# Patient Record
Sex: MALE
[De-identification: names, ages, dates, MRNs, and addresses within clinical notes are randomized per-mention and may not be internally consistent; named-entity substitution may affect disease eponyms.]

---

## 2017-12-15 PROBLEM — Z00.00 ENCOUNTER FOR PREVENTIVE HEALTH EXAMINATION: Status: ACTIVE | Noted: 2017-12-15

## 2018-01-08 ENCOUNTER — APPOINTMENT (OUTPATIENT)
Dept: CARDIOLOGY | Facility: CLINIC | Age: 38
End: 2018-01-08
Payer: COMMERCIAL

## 2018-01-08 PROCEDURE — 99214 OFFICE O/P EST MOD 30 MIN: CPT

## 2018-01-08 PROCEDURE — 93000 ELECTROCARDIOGRAM COMPLETE: CPT

## 2018-01-25 ENCOUNTER — APPOINTMENT (OUTPATIENT)
Dept: CARDIOLOGY | Facility: CLINIC | Age: 38
End: 2018-01-25

## 2018-02-15 ENCOUNTER — APPOINTMENT (OUTPATIENT)
Dept: CARDIOLOGY | Facility: CLINIC | Age: 38
End: 2018-02-15

## 2018-12-27 ENCOUNTER — RECORD ABSTRACTING (OUTPATIENT)
Age: 38
End: 2018-12-27

## 2018-12-27 DIAGNOSIS — S83.209A UNSPECIFIED TEAR OF UNSPECIFIED MENISCUS, CURRENT INJURY, UNSPECIFIED KNEE, INITIAL ENCOUNTER: ICD-10-CM

## 2018-12-27 DIAGNOSIS — Z82.49 FAMILY HISTORY OF ISCHEMIC HEART DISEASE AND OTHER DISEASES OF THE CIRCULATORY SYSTEM: ICD-10-CM

## 2018-12-27 DIAGNOSIS — M19.90 UNSPECIFIED OSTEOARTHRITIS, UNSPECIFIED SITE: ICD-10-CM

## 2018-12-27 DIAGNOSIS — Z78.9 OTHER SPECIFIED HEALTH STATUS: ICD-10-CM

## 2018-12-27 DIAGNOSIS — Z87.448 PERSONAL HISTORY OF OTHER DISEASES OF URINARY SYSTEM: ICD-10-CM

## 2018-12-31 ENCOUNTER — APPOINTMENT (OUTPATIENT)
Dept: CARDIOLOGY | Facility: CLINIC | Age: 38
End: 2018-12-31
Payer: COMMERCIAL

## 2018-12-31 VITALS
HEIGHT: 71 IN | DIASTOLIC BLOOD PRESSURE: 98 MMHG | HEART RATE: 70 BPM | OXYGEN SATURATION: 97 % | BODY MASS INDEX: 42.56 KG/M2 | RESPIRATION RATE: 14 BRPM | WEIGHT: 304 LBS | SYSTOLIC BLOOD PRESSURE: 163 MMHG

## 2018-12-31 VITALS — SYSTOLIC BLOOD PRESSURE: 150 MMHG | DIASTOLIC BLOOD PRESSURE: 90 MMHG

## 2018-12-31 PROCEDURE — 99214 OFFICE O/P EST MOD 30 MIN: CPT

## 2018-12-31 PROCEDURE — 93000 ELECTROCARDIOGRAM COMPLETE: CPT

## 2018-12-31 NOTE — HISTORY OF PRESENT ILLNESS
[FreeTextEntry1] : Patient is a 39yo M with obesity, HTN, OA here for cardiac follow up. Patient has been doing well without any chest pain or shortness of breath. Injured knee at work a few months back. HAs been getting PT, only mild improvement.  Patient denies PND/orthopnea/edema/palpitations/syncope/claudication CAnt climb stairs due to knee pain. \par \par ROS: GI and  negative

## 2018-12-31 NOTE — PHYSICAL EXAM
[General Appearance - Well Developed] : well developed [General Appearance - Well Nourished] : well nourished [Normal Conjunctiva] : the conjunctiva exhibited no abnormalities [Normal Oropharynx] : normal oropharynx [Normal Jugular Venous V Waves Present] : normal jugular venous V waves present [] : no respiratory distress [Respiration, Rhythm And Depth] : normal respiratory rhythm and effort [Auscultation Breath Sounds / Voice Sounds] : lungs were clear to auscultation bilaterally [Heart Rate And Rhythm] : heart rate and rhythm were normal [Heart Sounds] : normal S1 and S2 [Murmurs] : no murmurs present [Bowel Sounds] : normal bowel sounds [Abdomen Soft] : soft [Abdomen Tenderness] : non-tender [Abnormal Walk] : normal gait [Nail Clubbing] : no clubbing of the fingernails [Cyanosis, Localized] : no localized cyanosis [Skin Color & Pigmentation] : normal skin color and pigmentation [Skin Turgor] : normal skin turgor [Oriented To Time, Place, And Person] : oriented to person, place, and time [Affect] : the affect was normal [FreeTextEntry1] : no edema

## 2018-12-31 NOTE — DISCUSSION/SUMMARY
[FreeTextEntry1] : Patient is a 39yo M with obesity, HTN, OA here for cardiac follow up. Patient has been doing well without any chest pain or shortness of breath. HOwever BP suboptimally controlled and ECG with signs of LVH. Needs YANNICK treated as well, advised follow up with LI lung. \par \par 1. Increase amlodipine to 10mg daily, consider adding HCTZ or ARB if still suboptimally controlled\par 2. LI lung evaluation for YANNICK, needs CPAP\par 3. Recommend aggressive diet and lifestyle modifications \par 4. Increase physical activity as tolerated \par 5. Follow up 6 weeks

## 2018-12-31 NOTE — ASSESSMENT
[FreeTextEntry1] : ECG: SR, anterolateral and septal TWI\par  TG 70 HDL 40  (3/2018)\par \par EST 2016:\par 1. Negative exercise stress test for ischemia.\par 2. The patient developed no dysrhythmias during exercise.\par 3. The blood pressure response was hypertensive baseline with normal response to exercise.\par 4. The patient developed knee pain during the stress exam. The symptoms resolved with rest.\par 5. The test was terminated due to knee pain.\par 6. The patient's functional capacity was below average.\par 7. The Duke Treadmill Score was 7, consistent with low risk.\par 8. Recommend clinical correlation with the above findings.\par \par ECHO 2016:\par 1. Left ventricular ejection fraction, by visual estimation, is 60 to 65%.\par 2. Normal global left ventricular systolic function.\par 3. Impaired relaxation pattern of LV diastolic filling.\par 4. Normal left ventricular internal cavity size.\par 5. Mild concentric left ventricular hypertrophy.\par 6. Normal right ventricular size and systolic function.\par 7. Borderline dilated left atrium.\par 8. The right atrium is normal in size and structure.\par 9. Trace mitral valve regurgitation.\par 10. Normal aortic valve, without any evidence of aortic stenosis or insufficiency.\par 11. Trace pulmonic valve regurgitation.\par 12. There is borderline dilatation of the aortic root.\par 13. There is no evidence of pericardial effusion.\par 14. In comparison to prior studies there is no significant interval change.\par 15. Recommend clinical correlation with the above findings.

## 2019-02-05 ENCOUNTER — APPOINTMENT (OUTPATIENT)
Dept: CARDIOLOGY | Facility: CLINIC | Age: 39
End: 2019-02-05

## 2019-02-13 ENCOUNTER — APPOINTMENT (OUTPATIENT)
Dept: CARDIOLOGY | Facility: CLINIC | Age: 39
End: 2019-02-13
Payer: COMMERCIAL

## 2019-02-13 PROCEDURE — 93306 TTE W/DOPPLER COMPLETE: CPT

## 2019-02-14 ENCOUNTER — APPOINTMENT (OUTPATIENT)
Dept: CARDIOLOGY | Facility: CLINIC | Age: 39
End: 2019-02-14
Payer: COMMERCIAL

## 2019-02-14 VITALS
BODY MASS INDEX: 41.58 KG/M2 | DIASTOLIC BLOOD PRESSURE: 89 MMHG | HEART RATE: 61 BPM | SYSTOLIC BLOOD PRESSURE: 144 MMHG | OXYGEN SATURATION: 97 % | WEIGHT: 297 LBS | RESPIRATION RATE: 14 BRPM | HEIGHT: 71 IN

## 2019-02-14 PROCEDURE — 99213 OFFICE O/P EST LOW 20 MIN: CPT

## 2019-02-14 NOTE — HISTORY OF PRESENT ILLNESS
[FreeTextEntry1] : Patient is a 39yo M with obesity, HTN, OA here for cardiac follow up. Patient has been doing well without any chest pain or shortness of breath. Injured knee at work a few months back.  Patient denies PND/orthopnea/edema/palpitations/syncope/claudication CAnt climb stairs due to knee pain and still waiting further plan. Out on workers compensation. Tolerating higher dose amlodipine well. \par \par ROS: GI and  negative

## 2019-02-14 NOTE — ASSESSMENT
[FreeTextEntry1] : \par  TG 70 HDL 40  (3/2018)\par \par ECHO 2/2019:\par 1. Mild concentric left ventricular hypertrophy.\par 2. Normal global left ventricular systolic function.\par 3. Left ventricular ejection fraction, by visual estimation, is 55 to 60%.\par 4. Impaired relaxation pattern of LV diastolic filling.\par 5. Normal right ventricular size and systolic function.\par 6. Mildly dilated left atrium.\par 7. Moderately dilated right atrium.\par 8. Normal aortic valve, without any evidence of aortic stenosis or insufficiency.\par 9. Mild thickening of the anterior and posterior mitral valve leaflets.\par 10. Trace mitral valve regurgitation.\par 11. Mildly elevated pulmonary artery systolic pressure.\par 12. Mild tricuspid regurgitation.\par 13. Interatrial and interventricular septa appear intact.\par 14. Recommend clinical correlation with the above findings.\par \par EST 2016:\par 1. Negative exercise stress test for ischemia.\par 2. The patient developed no dysrhythmias during exercise.\par 3. The blood pressure response was hypertensive baseline with normal response to exercise.\par 4. The patient developed knee pain during the stress exam. The symptoms resolved with rest.\par 5. The test was terminated due to knee pain.\par 6. The patient's functional capacity was below average.\par 7. The Duke Treadmill Score was 7, consistent with low risk.\par 8. Recommend clinical correlation with the above findings.\par \par ECHO 2016:\par 1. Left ventricular ejection fraction, by visual estimation, is 60 to 65%.\par 2. Normal global left ventricular systolic function.\par 3. Impaired relaxation pattern of LV diastolic filling.\par 4. Normal left ventricular internal cavity size.\par 5. Mild concentric left ventricular hypertrophy.\par 6. Normal right ventricular size and systolic function.\par 7. Borderline dilated left atrium.\par 8. The right atrium is normal in size and structure.\par 9. Trace mitral valve regurgitation.\par 10. Normal aortic valve, without any evidence of aortic stenosis or insufficiency.\par 11. Trace pulmonic valve regurgitation.\par 12. There is borderline dilatation of the aortic root.\par 13. There is no evidence of pericardial effusion.\par 14. In comparison to prior studies there is no significant interval change.\par 15. Recommend clinical correlation with the above findings.

## 2019-02-14 NOTE — DISCUSSION/SUMMARY
[FreeTextEntry1] : Patient is a 37yo M with obesity, HTN, OA here for cardiac follow up. Patient has been doing well without any chest pain or shortness of breath. Echo with signs of hypertensive heart disease (LVH, borderline increased PAP, LAE). BP better, patient motivated to lose weight and exercise more. Will try this prior to starting additional medication. \par \par 1. Continue higher dose amlodipine\par 2. LI lung evaluation for YANNICK, needs CPAP\par 3. Recommend aggressive diet and lifestyle modifications \par 4. Increase physical activity as tolerated \par 5. Re-evaluate BP in 3 months, if remains elevated will add medication (HCTZ vs ARB)\par 6. Counselled on diet and weight loss\par 7. Follow up 3 months

## 2019-04-29 ENCOUNTER — RX RENEWAL (OUTPATIENT)
Age: 39
End: 2019-04-29

## 2019-07-10 ENCOUNTER — APPOINTMENT (OUTPATIENT)
Dept: CARDIOLOGY | Facility: CLINIC | Age: 39
End: 2019-07-10
Payer: COMMERCIAL

## 2019-07-10 ENCOUNTER — NON-APPOINTMENT (OUTPATIENT)
Age: 39
End: 2019-07-10

## 2019-07-10 VITALS
RESPIRATION RATE: 16 BRPM | WEIGHT: 305 LBS | HEART RATE: 59 BPM | BODY MASS INDEX: 42.7 KG/M2 | SYSTOLIC BLOOD PRESSURE: 137 MMHG | DIASTOLIC BLOOD PRESSURE: 85 MMHG | HEIGHT: 71 IN

## 2019-07-10 PROCEDURE — 93000 ELECTROCARDIOGRAM COMPLETE: CPT

## 2019-07-10 PROCEDURE — 99214 OFFICE O/P EST MOD 30 MIN: CPT

## 2019-07-10 NOTE — HISTORY OF PRESENT ILLNESS
[FreeTextEntry1] : Patient is a 39yo M with obesity, HTN, OA here for cardiac follow up. Patient has been doing well without any chest pain or shortness of breath. Injured knee at work a several months back.  Patient denies PND/orthopnea/edema/palpitations/syncope/claudication CAnt climb stairs due to knee pain and still waiting further plan. Remains out on workers compensation. \par \par ROS: GI and  negative

## 2019-07-10 NOTE — ASSESSMENT
[FreeTextEntry1] : ECG: SB, NSST \par \par  TG 70 HDL 40  (3/2018)\par \par ECHO 2/2019:\par 1. Mild concentric left ventricular hypertrophy.\par 2. Normal global left ventricular systolic function.\par 3. Left ventricular ejection fraction, by visual estimation, is 55 to 60%.\par 4. Impaired relaxation pattern of LV diastolic filling.\par 5. Normal right ventricular size and systolic function.\par 6. Mildly dilated left atrium.\par 7. Moderately dilated right atrium.\par 8. Normal aortic valve, without any evidence of aortic stenosis or insufficiency.\par 9. Mild thickening of the anterior and posterior mitral valve leaflets.\par 10. Trace mitral valve regurgitation.\par 11. Mildly elevated pulmonary artery systolic pressure.\par 12. Mild tricuspid regurgitation.\par 13. Interatrial and interventricular septa appear intact.\par 14. Recommend clinical correlation with the above findings.\par \par EST 2016:\par 1. Negative exercise stress test for ischemia.\par 2. The patient developed no dysrhythmias during exercise.\par 3. The blood pressure response was hypertensive baseline with normal response to exercise.\par 4. The patient developed knee pain during the stress exam. The symptoms resolved with rest.\par 5. The test was terminated due to knee pain.\par 6. The patient's functional capacity was below average.\par 7. The Duke Treadmill Score was 7, consistent with low risk.\par 8. Recommend clinical correlation with the above findings.\par \par ECHO 2016:\par 1. Left ventricular ejection fraction, by visual estimation, is 60 to 65%.\par 2. Normal global left ventricular systolic function.\par 3. Impaired relaxation pattern of LV diastolic filling.\par 4. Normal left ventricular internal cavity size.\par 5. Mild concentric left ventricular hypertrophy.\par 6. Normal right ventricular size and systolic function.\par 7. Borderline dilated left atrium.\par 8. The right atrium is normal in size and structure.\par 9. Trace mitral valve regurgitation.\par 10. Normal aortic valve, without any evidence of aortic stenosis or insufficiency.\par 11. Trace pulmonic valve regurgitation.\par 12. There is borderline dilatation of the aortic root.\par 13. There is no evidence of pericardial effusion.\par 14. In comparison to prior studies there is no significant interval change.\par 15. Recommend clinical correlation with the above findings.

## 2019-07-10 NOTE — PHYSICAL EXAM
[General Appearance - Well Developed] : well developed [General Appearance - Well Nourished] : well nourished [Normal Conjunctiva] : the conjunctiva exhibited no abnormalities [Normal Oropharynx] : normal oropharynx [] : no respiratory distress [Normal Jugular Venous V Waves Present] : normal jugular venous V waves present [Respiration, Rhythm And Depth] : normal respiratory rhythm and effort [Auscultation Breath Sounds / Voice Sounds] : lungs were clear to auscultation bilaterally [Heart Sounds] : normal S1 and S2 [Heart Rate And Rhythm] : heart rate and rhythm were normal [Murmurs] : no murmurs present [Bowel Sounds] : normal bowel sounds [Abdomen Tenderness] : non-tender [Abdomen Soft] : soft [Abnormal Walk] : normal gait [Nail Clubbing] : no clubbing of the fingernails [Cyanosis, Localized] : no localized cyanosis [Skin Color & Pigmentation] : normal skin color and pigmentation [Oriented To Time, Place, And Person] : oriented to person, place, and time [Skin Turgor] : normal skin turgor [Affect] : the affect was normal [FreeTextEntry1] : no edema

## 2019-12-19 ENCOUNTER — NON-APPOINTMENT (OUTPATIENT)
Age: 39
End: 2019-12-19

## 2019-12-19 ENCOUNTER — APPOINTMENT (OUTPATIENT)
Dept: CARDIOLOGY | Facility: CLINIC | Age: 39
End: 2019-12-19
Payer: COMMERCIAL

## 2019-12-19 VITALS
BODY MASS INDEX: 41.86 KG/M2 | HEIGHT: 71 IN | RESPIRATION RATE: 16 BRPM | WEIGHT: 299 LBS | OXYGEN SATURATION: 97 % | DIASTOLIC BLOOD PRESSURE: 110 MMHG | SYSTOLIC BLOOD PRESSURE: 159 MMHG | HEART RATE: 60 BPM

## 2019-12-19 PROCEDURE — 99214 OFFICE O/P EST MOD 30 MIN: CPT

## 2019-12-19 PROCEDURE — 93000 ELECTROCARDIOGRAM COMPLETE: CPT

## 2019-12-19 NOTE — ASSESSMENT
[FreeTextEntry1] : ECG: SB, NSST \par \par  TG 70 HDL 40  (3/2018)\par \par ECHO 2/2019:\par 1. Mild concentric left ventricular hypertrophy.\par 2. Normal global left ventricular systolic function.\par 3. Left ventricular ejection fraction, by visual estimation, is 55 to 60%.\par 4. Impaired relaxation pattern of LV diastolic filling.\par 5. Normal right ventricular size and systolic function.\par 6. Mildly dilated left atrium.\par 7. Moderately dilated right atrium.\par 8. Normal aortic valve, without any evidence of aortic stenosis or insufficiency.\par 9. Mild thickening of the anterior and posterior mitral valve leaflets.\par 10. Trace mitral valve regurgitation.\par 11. Mildly elevated pulmonary artery systolic pressure.\par 12. Mild tricuspid regurgitation.\par 13. Interatrial and interventricular septa appear intact.\par 14. Recommend clinical correlation with the above findings.\par \par EST 2016:\par 1. Negative exercise stress test for ischemia.\par 2. The patient developed no dysrhythmias during exercise.\par 3. The blood pressure response was hypertensive baseline with normal response to exercise.\par 4. The patient developed knee pain during the stress exam. The symptoms resolved with rest.\par 5. The test was terminated due to knee pain.\par 6. The patient's functional capacity was below average.\par 7. The Duke Treadmill Score was 7, consistent with low risk.\par \par

## 2019-12-19 NOTE — HISTORY OF PRESENT ILLNESS
[FreeTextEntry1] : Patient is a 40yo M with obesity, HTN, OA here for cardiac follow up. Patient has been doing well without any chest pain or shortness of breath. Injured knee at work a several months back and had been out on workers compensation which he still is. Patient denies PND/orthopnea/edema/palpitations/syncope/claudication. HCTZ added at last visit for HTN. However not taking BP meds now. Was having side effects including sexual side effects. NOt feeling well on meds as well, felt "drugged". \par \par ROS: GI and  negative

## 2019-12-19 NOTE — PHYSICAL EXAM
[General Appearance - Well Developed] : well developed [General Appearance - Well Nourished] : well nourished [Normal Conjunctiva] : the conjunctiva exhibited no abnormalities [Normal Oropharynx] : normal oropharynx [] : no respiratory distress [Normal Jugular Venous V Waves Present] : normal jugular venous V waves present [Auscultation Breath Sounds / Voice Sounds] : lungs were clear to auscultation bilaterally [Respiration, Rhythm And Depth] : normal respiratory rhythm and effort [Heart Rate And Rhythm] : heart rate and rhythm were normal [Heart Sounds] : normal S1 and S2 [Murmurs] : no murmurs present [Bowel Sounds] : normal bowel sounds [Abdomen Soft] : soft [Abdomen Tenderness] : non-tender [Nail Clubbing] : no clubbing of the fingernails [Abnormal Walk] : normal gait [Cyanosis, Localized] : no localized cyanosis [Skin Color & Pigmentation] : normal skin color and pigmentation [Skin Turgor] : normal skin turgor [Oriented To Time, Place, And Person] : oriented to person, place, and time [Affect] : the affect was normal [FreeTextEntry1] : no edema

## 2020-03-16 ENCOUNTER — APPOINTMENT (OUTPATIENT)
Dept: CARDIOLOGY | Facility: CLINIC | Age: 40
End: 2020-03-16

## 2020-03-24 ENCOUNTER — APPOINTMENT (OUTPATIENT)
Dept: CARDIOLOGY | Facility: CLINIC | Age: 40
End: 2020-03-24

## 2020-04-24 ENCOUNTER — APPOINTMENT (OUTPATIENT)
Dept: CARDIOLOGY | Facility: CLINIC | Age: 40
End: 2020-04-24
Payer: COMMERCIAL

## 2020-04-24 PROCEDURE — 99214 OFFICE O/P EST MOD 30 MIN: CPT | Mod: 95

## 2020-04-24 NOTE — HISTORY OF PRESENT ILLNESS
[Other Location: e.g. Home (Enter Location, City,State)___] : at [unfilled] [Home] : at home, [unfilled] , at the time of the visit. [Patient] : the patient [Self] : self [FreeTextEntry2] : Adam Khan [FreeTextEntry1] : Patient is a 38yo M with obesity, HTN, OA here for cardiac telehealth follow up. Patient has been doing well without any chest pain or shortness of breath. Injured knee at work a several months back and had been out on workers compensation which he still is. Started on Valsartan at last visit but stopped due to concern about side effects. Patient denies PND/orthopnea/edema/palpitations/syncope/claudication. HAs been monitoring BP at home. TOday 140/96, has been 150s/90s as well. HAd one reading 118/75. Has been walking and yoga as a family. Taking ASA and vitamins, eldeberry. Cutting back portions, has lost some weight prior to pandemic but now he is unsure. Wife had a viral illness recently but completely recovered now. Was having myalgias, cough. Wore mask and quarantined in house until resolved. No one else in house ill since then. She has been asx for a few weeks now. \par \par ROS: GI and  negative

## 2020-04-24 NOTE — DISCUSSION/SUMMARY
[FreeTextEntry1] : Patient is a 38yo M with obesity, HTN, OA here for cardiac telehealth follow up. Patient has been doing well without any chest pain or shortness of breath. Echo with signs of hypertensive heart disease (LVH, borderline increased PAP, LAE) in 2019. Needs continued BP control and aggressive diet/lifestyle modifications. BP seems to improve and correlate with weight loss then up with gain. HAs not tolerated multiple meds. Will hold off on restarting medication and continue diet/lifestyle changes first. May need to reinstitute in future and patient understands despite reluctance. Wife likely false negative COVID but has fully recovered. PAtient ? had mild case (brief mild sore throat) but not symptoms to suggest it right not. Continue social distancing and wearing mask/face covering when leaves house. \par \par 1. No meds at this time\par 2. Pulm follow up for YANNICK \par 3. Recommend aggressive diet and lifestyle modifications , counselled on weight loss\par 4. Increase physical activity as tolerated , limited by knee pain\par 5. Follow up 3-4 months, echo at that time\par \par  A total of 25 minutes was spent face to face with patient \par \par

## 2020-04-24 NOTE — ASSESSMENT
[FreeTextEntry1] : \par \par  TG 70 HDL 40  (3/2018)\par \par ECHO 2/2019:\par 1. Mild concentric left ventricular hypertrophy.\par 2. Normal global left ventricular systolic function.\par 3. Left ventricular ejection fraction, by visual estimation, is 55 to 60%.\par 4. Impaired relaxation pattern of LV diastolic filling.\par 5. Normal right ventricular size and systolic function.\par 6. Mildly dilated left atrium.\par 7. Moderately dilated right atrium.\par 8. Normal aortic valve, without any evidence of aortic stenosis or insufficiency.\par 9. Mild thickening of the anterior and posterior mitral valve leaflets.\par 10. Trace mitral valve regurgitation.\par 11. Mildly elevated pulmonary artery systolic pressure.\par 12. Mild tricuspid regurgitation.\par 13. Interatrial and interventricular septa appear intact.\par 14. Recommend clinical correlation with the above findings.\par \par EST 2016:\par 1. Negative exercise stress test for ischemia.\par 2. The patient developed no dysrhythmias during exercise.\par 3. The blood pressure response was hypertensive baseline with normal response to exercise.\par 4. The patient developed knee pain during the stress exam. The symptoms resolved with rest.\par 5. The test was terminated due to knee pain.\par 6. The patient's functional capacity was below average.\par 7. The Duke Treadmill Score was 7, consistent with low risk.\par \par

## 2020-07-15 ENCOUNTER — APPOINTMENT (OUTPATIENT)
Dept: CARDIOLOGY | Facility: CLINIC | Age: 40
End: 2020-07-15
Payer: COMMERCIAL

## 2020-07-15 PROCEDURE — 93306 TTE W/DOPPLER COMPLETE: CPT

## 2020-07-23 ENCOUNTER — APPOINTMENT (OUTPATIENT)
Dept: CARDIOLOGY | Facility: CLINIC | Age: 40
End: 2020-07-23
Payer: COMMERCIAL

## 2020-07-23 PROCEDURE — 99213 OFFICE O/P EST LOW 20 MIN: CPT | Mod: 95

## 2020-07-23 RX ORDER — VALSARTAN 80 MG/1
80 TABLET, COATED ORAL DAILY
Qty: 30 | Refills: 5 | Status: DISCONTINUED | COMMUNITY
Start: 2019-12-19 | End: 2020-07-23

## 2020-07-23 RX ORDER — HYDROCHLOROTHIAZIDE 12.5 MG/1
12.5 TABLET ORAL
Qty: 30 | Refills: 5 | Status: DISCONTINUED | COMMUNITY
Start: 2019-07-10 | End: 2020-07-23

## 2020-07-23 RX ORDER — AMLODIPINE BESYLATE 10 MG/1
10 TABLET ORAL DAILY
Qty: 90 | Refills: 0 | Status: DISCONTINUED | COMMUNITY
Start: 2019-04-29 | End: 2020-07-23

## 2020-07-23 NOTE — PHYSICAL EXAM
[General Appearance - Well Nourished] : well nourished [General Appearance - Well Developed] : well developed [Oriented To Time, Place, And Person] : oriented to person, place, and time

## 2020-07-23 NOTE — ASSESSMENT
[FreeTextEntry1] : \par \par  TG 70 HDL 40  (3/2018)\par \par ECHO 7/2020:\par 1. Mild LVE, EF 55-60%\par 2. Grade I diastolic dysfunction\par 3. Mild LAE,  borderline CAMMIE\par 4. Trivial MR\par 5. LV wall thickness,  PA pressures and RA size have all decreased compared to prior\par \par ECHO 2/2019:\par 1. Mild concentric left ventricular hypertrophy.\par 2. Normal global left ventricular systolic function.\par 3. Left ventricular ejection fraction, by visual estimation, is 55 to 60%.\par 4. Impaired relaxation pattern of LV diastolic filling.\par 5. Normal right ventricular size and systolic function.\par 6. Mildly dilated left atrium.\par 7. Moderately dilated right atrium.\par 8. Normal aortic valve, without any evidence of aortic stenosis or insufficiency.\par 9. Mild thickening of the anterior and posterior mitral valve leaflets.\par 10. Trace mitral valve regurgitation.\par 11. Mildly elevated pulmonary artery systolic pressure.\par 12. Mild tricuspid regurgitation.\par 13. Interatrial and interventricular septa appear intact.\par 14. Recommend clinical correlation with the above findings.\par \par EST 2016:\par 1. Negative exercise stress test for ischemia.\par 2. The patient developed no dysrhythmias during exercise.\par 3. The blood pressure response was hypertensive baseline with normal response to exercise.\par 4. The patient developed knee pain during the stress exam. The symptoms resolved with rest.\par 5. The test was terminated due to knee pain.\par 6. The patient's functional capacity was below average.\par 7. The Duke Treadmill Score was 7, consistent with low risk.\par \par

## 2020-07-23 NOTE — DISCUSSION/SUMMARY
[FreeTextEntry1] : Patient is a 38yo M with obesity, HTN, OA here for cardiac telehealth follow up. Patient has been doing well without any chest pain or shortness of breath. Echo with signs of hypertensive heart disease (LVH, borderline increased PAP, LAE) in 2019 but improved on recent echo this month. WEight down, BP better and will continue with diet/weight loss exercise before putting back on meds. HAs lost over 30 pounds. \par \par 1. Recommend aggressive diet and lifestyle modifications \par 2. Recommend 30 minutes moderate intensity aerobic activity 5 days per week \par 3. Hold off on meds for now\par 4. Follow up 3 months\par \par

## 2020-10-20 ENCOUNTER — APPOINTMENT (OUTPATIENT)
Dept: CARDIOLOGY | Facility: CLINIC | Age: 40
End: 2020-10-20
Payer: COMMERCIAL

## 2020-10-20 ENCOUNTER — NON-APPOINTMENT (OUTPATIENT)
Age: 40
End: 2020-10-20

## 2020-10-20 VITALS
SYSTOLIC BLOOD PRESSURE: 168 MMHG | RESPIRATION RATE: 16 BRPM | TEMPERATURE: 97.3 F | BODY MASS INDEX: 36.4 KG/M2 | WEIGHT: 260 LBS | OXYGEN SATURATION: 100 % | HEIGHT: 71 IN | DIASTOLIC BLOOD PRESSURE: 106 MMHG | HEART RATE: 60 BPM

## 2020-10-20 VITALS — DIASTOLIC BLOOD PRESSURE: 90 MMHG | SYSTOLIC BLOOD PRESSURE: 142 MMHG

## 2020-10-20 PROCEDURE — 99213 OFFICE O/P EST LOW 20 MIN: CPT

## 2020-10-20 PROCEDURE — 93000 ELECTROCARDIOGRAM COMPLETE: CPT

## 2020-10-20 RX ORDER — ASPIRIN 81 MG
81 TABLET, DELAYED RELEASE (ENTERIC COATED) ORAL DAILY
Refills: 3 | Status: DISCONTINUED | COMMUNITY
End: 2020-10-20

## 2020-10-20 NOTE — PHYSICAL EXAM
[General Appearance - Well Developed] : well developed [General Appearance - Well Nourished] : well nourished [Oriented To Time, Place, And Person] : oriented to person, place, and time [Normal Conjunctiva] : the conjunctiva exhibited no abnormalities [Normal Oropharynx] : normal oropharynx [Normal Jugular Venous V Waves Present] : normal jugular venous V waves present [Respiration, Rhythm And Depth] : normal respiratory rhythm and effort [Auscultation Breath Sounds / Voice Sounds] : lungs were clear to auscultation bilaterally [Heart Rate And Rhythm] : heart rate and rhythm were normal [Heart Sounds] : normal S1 and S2 [Edema] : no peripheral edema present [Murmurs] : no murmurs present [Abdomen Soft] : soft [Abdomen Tenderness] : non-tender [Nail Clubbing] : no clubbing of the fingernails [Abnormal Walk] : normal gait [Skin Color & Pigmentation] : normal skin color and pigmentation [Cyanosis, Localized] : no localized cyanosis [Skin Turgor] : normal skin turgor

## 2020-10-20 NOTE — DISCUSSION/SUMMARY
[FreeTextEntry1] : Patient is a 41yo M with obesity, HTN, OA here for cardiac follow up.  Patient has been doing well without any chest pain or shortness of breath. Echo with signs of hypertensive heart disease (LVH, borderline increased PAP, LAE) in 2019 but improved on recent echo this last summer.  WEight down, BP better and will continue with diet/weight loss exercise before putting back on meds. HAs lost significant weight. BP at home seems mostly well controlled. Continue with weight loss. \par \par 1. Recommend aggressive diet and lifestyle modifications \par 2. Recommend 30 minutes moderate intensity aerobic activity 5 days per week \par 3. Hold off on meds again  for now\par 4. Follow up 6 months\par \par

## 2020-10-20 NOTE — HISTORY OF PRESENT ILLNESS
[FreeTextEntry1] : Patient is a 41yo M with obesity, HTN, OA here for cardiac telehealth follow up. Patient has been doing well without any chest pain or shortness of breath. Injured knee at work a several months back and had been out on workers compensation which remains. Patient denies PND/orthopnea/edema/palpitations/syncope/claudication. Continuing to lose weight, intermittent fasting. Eating out less and cooking at home. Walking more as well, a bit less past couple weeks. BP at home running 124/84 yesterday, 143/81 last week and then 127/79 in evening.  \par \par ROS: GI and  negative

## 2020-10-20 NOTE — ASSESSMENT
[FreeTextEntry1] : SR, early repolarization \par \par  TG 70 HDL 40  (3/2018)\par \par ECHO 7/2020:\par 1. Mild LVE, EF 55-60%\par 2. Grade I diastolic dysfunction\par 3. Mild LAE,  borderline CAMMIE\par 4. Trivial MR\par 5. LV wall thickness,  PA pressures and RA size have all decreased compared to prior\par \par ECHO 2/2019:\par 1. Mild concentric left ventricular hypertrophy.\par 2. Normal global left ventricular systolic function.\par 3. Left ventricular ejection fraction, by visual estimation, is 55 to 60%.\par 4. Impaired relaxation pattern of LV diastolic filling.\par 5. Normal right ventricular size and systolic function.\par 6. Mildly dilated left atrium.\par 7. Moderately dilated right atrium.\par 8. Normal aortic valve, without any evidence of aortic stenosis or insufficiency.\par 9. Mild thickening of the anterior and posterior mitral valve leaflets.\par 10. Trace mitral valve regurgitation.\par 11. Mildly elevated pulmonary artery systolic pressure.\par 12. Mild tricuspid regurgitation.\par 13. Interatrial and interventricular septa appear intact.\par 14. Recommend clinical correlation with the above findings.\par \par EST 2016:\par 1. Negative exercise stress test for ischemia.\par 2. The patient developed no dysrhythmias during exercise.\par 3. The blood pressure response was hypertensive baseline with normal response to exercise.\par 4. The patient developed knee pain during the stress exam. The symptoms resolved with rest.\par 5. The test was terminated due to knee pain.\par 6. The patient's functional capacity was below average.\par 7. The Duke Treadmill Score was 7, consistent with low risk.\par \par

## 2020-12-15 ENCOUNTER — NON-APPOINTMENT (OUTPATIENT)
Age: 40
End: 2020-12-15

## 2020-12-28 DIAGNOSIS — I10 ESSENTIAL (PRIMARY) HYPERTENSION: ICD-10-CM

## 2021-04-06 ENCOUNTER — NON-APPOINTMENT (OUTPATIENT)
Age: 41
End: 2021-04-06

## 2021-04-06 ENCOUNTER — APPOINTMENT (OUTPATIENT)
Dept: CARDIOLOGY | Facility: CLINIC | Age: 41
End: 2021-04-06
Payer: COMMERCIAL

## 2021-04-06 VITALS
DIASTOLIC BLOOD PRESSURE: 89 MMHG | OXYGEN SATURATION: 98 % | WEIGHT: 277 LBS | HEART RATE: 64 BPM | BODY MASS INDEX: 38.78 KG/M2 | HEIGHT: 71 IN | TEMPERATURE: 98.3 F | SYSTOLIC BLOOD PRESSURE: 137 MMHG | RESPIRATION RATE: 16 BRPM

## 2021-04-06 PROCEDURE — 93000 ELECTROCARDIOGRAM COMPLETE: CPT

## 2021-04-06 PROCEDURE — 99072 ADDL SUPL MATRL&STAF TM PHE: CPT

## 2021-04-06 PROCEDURE — 99214 OFFICE O/P EST MOD 30 MIN: CPT

## 2021-04-06 RX ORDER — ASPIRIN ENTERIC COATED TABLETS 81 MG 81 MG/1
81 TABLET, DELAYED RELEASE ORAL
Refills: 0 | Status: DISCONTINUED | COMMUNITY
End: 2021-04-06

## 2021-04-06 NOTE — PHYSICAL EXAM
[General Appearance - Well Developed] : well developed [General Appearance - Well Nourished] : well nourished [Normal Conjunctiva] : the conjunctiva exhibited no abnormalities [Normal Oropharynx] : normal oropharynx [Normal Jugular Venous V Waves Present] : normal jugular venous V waves present [Respiration, Rhythm And Depth] : normal respiratory rhythm and effort [Auscultation Breath Sounds / Voice Sounds] : lungs were clear to auscultation bilaterally [Heart Rate And Rhythm] : heart rate and rhythm were normal [Heart Sounds] : normal S1 and S2 [Murmurs] : no murmurs present [Edema] : no peripheral edema present [Abdomen Soft] : soft [Abdomen Tenderness] : non-tender [Abnormal Walk] : normal gait [Nail Clubbing] : no clubbing of the fingernails [Cyanosis, Localized] : no localized cyanosis [Skin Color & Pigmentation] : normal skin color and pigmentation [Skin Turgor] : normal skin turgor [Oriented To Time, Place, And Person] : oriented to person, place, and time

## 2021-04-06 NOTE — ASSESSMENT
[FreeTextEntry1] : ECG: SR, no significant ST-T abnormalities and normal intervals \par \par  TG 70 HDL 40  (3/2018)\par \par ECHO 7/2020:\par 1. Mild LVE, EF 55-60%\par 2. Grade I diastolic dysfunction\par 3. Mild LAE,  borderline CAMMIE\par 4. Trivial MR\par 5. LV wall thickness,  PA pressures and RA size have all decreased compared to prior\par \par ECHO 2/2019:\par 1. Mild concentric left ventricular hypertrophy.\par 2. Normal global left ventricular systolic function.\par 3. Left ventricular ejection fraction, by visual estimation, is 55 to 60%.\par 4. Impaired relaxation pattern of LV diastolic filling.\par 5. Normal right ventricular size and systolic function.\par 6. Mildly dilated left atrium.\par 7. Moderately dilated right atrium.\par 8. Normal aortic valve, without any evidence of aortic stenosis or insufficiency.\par 9. Mild thickening of the anterior and posterior mitral valve leaflets.\par 10. Trace mitral valve regurgitation.\par 11. Mildly elevated pulmonary artery systolic pressure.\par 12. Mild tricuspid regurgitation.\par 13. Interatrial and interventricular septa appear intact.\par 14. Recommend clinical correlation with the above findings.\par \par EST 2016:\par 1. Negative exercise stress test for ischemia.\par 2. The patient developed no dysrhythmias during exercise.\par 3. The blood pressure response was hypertensive baseline with normal response to exercise.\par 4. The patient developed knee pain during the stress exam. The symptoms resolved with rest.\par 5. The test was terminated due to knee pain.\par 6. The patient's functional capacity was below average.\par 7. The Duke Treadmill Score was 7, consistent with low risk.\par \par

## 2021-04-06 NOTE — DISCUSSION/SUMMARY
[FreeTextEntry1] : Patient is a 41yo M with obesity, HTN, OA here for cardiac follow up.  Patient has been doing well without any chest pain or shortness of breath. \par -Echo with signs of hypertensive heart disease (LVH, borderline increased PAP, LAE) in 2019 but improved on recent echo 2020  \par -BP borderline elevated, had missed am dose meds as well. Does better at lower weight and motivated again to lose weight\par \par \par 1. Recommend aggressive diet and lifestyle modifications \par 2. Recommend 30 minutes moderate intensity aerobic activity 5 days per week \par 3. Continue amlodipine 5mg po bid \par 4. Follow up 6 months\par \par

## 2021-04-06 NOTE — HISTORY OF PRESENT ILLNESS
[FreeTextEntry1] : Patient is a 41yo M with obesity, HTN, OA here for cardiac  follow up. Patient has been doing well without any chest pain or shortness of breath. Injured knee at work a several months back and had been out on workers compensation which remains. Patient denies PND/orthopnea/edema/palpitations/syncope/claudication. Had been losing weight with intermittent fasting but back up again. States winter has been difficult maintaining diet and weight. HAd been down to 255 but up now. Has not taken am dose amlodipine today. HAs not been checking BP regularly at home. When checks mostly 130s. \par \par ROS: GI and  negative

## 2021-08-06 ENCOUNTER — NON-APPOINTMENT (OUTPATIENT)
Age: 41
End: 2021-08-06

## 2021-09-11 ENCOUNTER — RX RENEWAL (OUTPATIENT)
Age: 41
End: 2021-09-11

## 2021-09-13 ENCOUNTER — NON-APPOINTMENT (OUTPATIENT)
Age: 41
End: 2021-09-13

## 2021-09-13 ENCOUNTER — APPOINTMENT (OUTPATIENT)
Dept: CARDIOLOGY | Facility: CLINIC | Age: 41
End: 2021-09-13
Payer: COMMERCIAL

## 2021-09-13 VITALS
RESPIRATION RATE: 16 BRPM | SYSTOLIC BLOOD PRESSURE: 154 MMHG | BODY MASS INDEX: 40.32 KG/M2 | HEIGHT: 71 IN | WEIGHT: 288 LBS | HEART RATE: 58 BPM | DIASTOLIC BLOOD PRESSURE: 90 MMHG

## 2021-09-13 PROCEDURE — 93000 ELECTROCARDIOGRAM COMPLETE: CPT

## 2021-09-13 PROCEDURE — 99214 OFFICE O/P EST MOD 30 MIN: CPT

## 2021-09-13 NOTE — DISCUSSION/SUMMARY
[FreeTextEntry1] : Patient is a 42yo M with obesity, HTN, OA here for cardiac follow up.  \par Patient has been doing well without any chest pain or shortness of breath. \par -Echo with signs of hypertensive heart disease (LVH, borderline increased PAP, LAE) in 2019 but improved on  echo 2020  \par -BP still needs better control and needs to lose weight\par \par \par 1. ?? ED on valsartan. Will start Lisinopril 10mg daily for HTN, check BMP 2 weeks\par 2. Recommend aggressive diet and lifestyle modifications \par 3. Recommend 30 minutes moderate intensity aerobic activity 5 days per week \par 4. Counselled on weight loss\par 5. Follow up 4 months \par \par

## 2021-09-13 NOTE — HISTORY OF PRESENT ILLNESS
[FreeTextEntry1] : Patient is a 42yo M with obesity, HTN, OA here for cardiac  follow up. Patient has been doing well without any chest pain or shortness of breath. Injured knee at work a several months back and had been out on workers compensation which remains. Patient denies PND/orthopnea/edema/palpitations/syncope/claudication. HCTZ increased recently to 25mg daily. Monitoring BP at home running 140s/80s. Has cut back salt. \par \par Some dietary indiscretion this past weekend for daughters 13th birthday. continues to work with setups for wife who does event planning. 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  \par \par ROS: GI and  negative

## 2021-09-13 NOTE — ASSESSMENT
[FreeTextEntry1] : ECG: SB, no significant ST-T abnormalities and normal intervals \par \par  TG 70 HDL 40  (3/2018)\par \par ECHO 7/2020:\par 1. Mild LVE, EF 55-60%\par 2. Grade I diastolic dysfunction\par 3. Mild LAE,  borderline CAMMIE\par 4. Trivial MR\par 5. LV wall thickness,  PA pressures and RA size have all decreased compared to prior\par \par ECHO 2/2019:\par 1. Mild concentric left ventricular hypertrophy.\par 2. Normal global left ventricular systolic function.\par 3. Left ventricular ejection fraction, by visual estimation, is 55 to 60%.\par 4. Impaired relaxation pattern of LV diastolic filling.\par 5. Normal right ventricular size and systolic function.\par 6. Mildly dilated left atrium.\par 7. Moderately dilated right atrium.\par 8. Normal aortic valve, without any evidence of aortic stenosis or insufficiency.\par 9. Mild thickening of the anterior and posterior mitral valve leaflets.\par 10. Trace mitral valve regurgitation.\par 11. Mildly elevated pulmonary artery systolic pressure.\par 12. Mild tricuspid regurgitation.\par 13. Interatrial and interventricular septa appear intact.\par 14. Recommend clinical correlation with the above findings.\par \par EST 2016:\par 1. Negative exercise stress test for ischemia.\par 2. The patient developed no dysrhythmias during exercise.\par 3. The blood pressure response was hypertensive baseline with normal response to exercise.\par 4. The patient developed knee pain during the stress exam. The symptoms resolved with rest.\par 5. The test was terminated due to knee pain.\par 6. The patient's functional capacity was below average.\par 7. The Duke Treadmill Score was 7, consistent with low risk.\par \par

## 2021-09-20 ENCOUNTER — NON-APPOINTMENT (OUTPATIENT)
Age: 41
End: 2021-09-20

## 2022-03-14 ENCOUNTER — NON-APPOINTMENT (OUTPATIENT)
Age: 42
End: 2022-03-14

## 2022-03-14 ENCOUNTER — APPOINTMENT (OUTPATIENT)
Dept: CARDIOLOGY | Facility: CLINIC | Age: 42
End: 2022-03-14
Payer: COMMERCIAL

## 2022-03-14 VITALS
SYSTOLIC BLOOD PRESSURE: 142 MMHG | HEIGHT: 71 IN | DIASTOLIC BLOOD PRESSURE: 83 MMHG | OXYGEN SATURATION: 97 % | HEART RATE: 56 BPM | RESPIRATION RATE: 16 BRPM | WEIGHT: 283 LBS | BODY MASS INDEX: 39.62 KG/M2

## 2022-03-14 PROCEDURE — 93000 ELECTROCARDIOGRAM COMPLETE: CPT

## 2022-03-14 PROCEDURE — 99214 OFFICE O/P EST MOD 30 MIN: CPT

## 2022-03-14 RX ORDER — UBIDECARENONE/VIT E ACET 100MG-5
CAPSULE ORAL
Refills: 0 | Status: ACTIVE | COMMUNITY

## 2022-05-05 NOTE — ASSESSMENT
[FreeTextEntry1] : ECG: SB, anteroseptal TWI \par \par  TG 70 HDL 40  (3/2018)\par \par ECHO 7/2020:\par 1. Mild LVE, EF 55-60%\par 2. Grade I diastolic dysfunction\par 3. Mild LAE,  borderline CAMMIE\par 4. Trivial MR\par 5. LV wall thickness,  PA pressures and RA size have all decreased compared to prior\par \par ECHO 2/2019:\par 1. Mild concentric left ventricular hypertrophy.\par 2. Normal global left ventricular systolic function.\par 3. Left ventricular ejection fraction, by visual estimation, is 55 to 60%.\par 4. Impaired relaxation pattern of LV diastolic filling.\par 5. Normal right ventricular size and systolic function.\par 6. Mildly dilated left atrium.\par 7. Moderately dilated right atrium.\par 8. Normal aortic valve, without any evidence of aortic stenosis or insufficiency.\par 9. Mild thickening of the anterior and posterior mitral valve leaflets.\par 10. Trace mitral valve regurgitation.\par 11. Mildly elevated pulmonary artery systolic pressure.\par 12. Mild tricuspid regurgitation.\par 13. Interatrial and interventricular septa appear intact.\par 14. Recommend clinical correlation with the above findings.\par \par EST 2016:\par 1. Negative exercise stress test for ischemia.\par 2. The patient developed no dysrhythmias during exercise.\par 3. The blood pressure response was hypertensive baseline with normal response to exercise.\par 4. The patient developed knee pain during the stress exam. The symptoms resolved with rest.\par 5. The test was terminated due to knee pain.\par 6. The patient's functional capacity was below average.\par 7. The Duke Treadmill Score was 7, consistent with low risk.\par \par

## 2022-05-05 NOTE — HISTORY OF PRESENT ILLNESS
[FreeTextEntry1] : Patient is a 40yo M with obesity, HTN, OA here for cardiac  follow up. Patient has been doing well without any chest pain or shortness of breath. Injured knee at work a several months back and had been out on workers compensation which remains. Patient denies PND/orthopnea/edema/palpitations/syncope/claudication. ARB changed to ACEI and now on lisinopril. Possible side effects of ED due to valsartan. Has missed some doses  but back on it now. BP at home running 120s-140s/70-80s. No recurrence of ED. 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 Daughters had COVID in January, mild symptoms.   \par \par ROS: GI and  negative

## 2022-06-27 ENCOUNTER — OFFICE (OUTPATIENT)
Dept: URBAN - METROPOLITAN AREA CLINIC 63 | Facility: CLINIC | Age: 42
Setting detail: OPHTHALMOLOGY
End: 2022-06-27
Payer: COMMERCIAL

## 2022-06-27 DIAGNOSIS — H35.413: ICD-10-CM

## 2022-06-27 DIAGNOSIS — H35.033: ICD-10-CM

## 2022-06-27 DIAGNOSIS — H33.311: ICD-10-CM

## 2022-06-27 DIAGNOSIS — H33.313: ICD-10-CM

## 2022-06-27 DIAGNOSIS — H35.3131: ICD-10-CM

## 2022-06-27 PROCEDURE — 92250 FUNDUS PHOTOGRAPHY W/I&R: CPT | Performed by: SPECIALIST

## 2022-06-27 PROCEDURE — 67145 PROPH RTA DTCHMNT PC: CPT | Performed by: SPECIALIST

## 2022-06-27 PROCEDURE — 92004 COMPRE OPH EXAM NEW PT 1/>: CPT | Performed by: SPECIALIST

## 2022-06-27 ASSESSMENT — TONOMETRY
OS_IOP_MMHG: 20
OD_IOP_MMHG: 21

## 2022-06-27 ASSESSMENT — CONFRONTATIONAL VISUAL FIELD TEST (CVF)
OD_FINDINGS: FULL
OS_FINDINGS: FULL

## 2022-06-27 ASSESSMENT — VISUAL ACUITY
OD_BCVA: 20/20
OS_BCVA: 20/20

## 2022-07-18 ENCOUNTER — ASC (OUTPATIENT)
Dept: URBAN - METROPOLITAN AREA SURGERY 8 | Facility: SURGERY | Age: 42
Setting detail: OPHTHALMOLOGY
End: 2022-07-18
Payer: COMMERCIAL

## 2022-07-18 DIAGNOSIS — H33.312: ICD-10-CM

## 2022-07-18 PROCEDURE — 67145 PROPH RTA DTCHMNT PC: CPT | Performed by: SPECIALIST

## 2022-07-18 ASSESSMENT — CONFRONTATIONAL VISUAL FIELD TEST (CVF)
OD_FINDINGS: FULL
OS_FINDINGS: FULL

## 2022-07-18 ASSESSMENT — VISUAL ACUITY
OD_BCVA: 20/20
OS_BCVA: 20/20

## 2022-07-18 ASSESSMENT — TONOMETRY
OD_IOP_MMHG: 15
OS_IOP_MMHG: 16

## 2022-08-21 NOTE — DISCUSSION/SUMMARY
Patients wife stated that the patient was not acting right. Patient's BG dropped to 50s, then 43 in the squad.  Patient is alert and talking, BG is now 104
[FreeTextEntry1] : Patient is a 38yo M with obesity, HTN, OA here for cardiac follow up. Patient has been doing well without any chest pain or shortness of breath. Echo with signs of hypertensive heart disease (LVH, borderline increased PAP, LAE) earlier this year. Needs continued BP control and aggressive diet/lifestyle modifications. Off meds due to side effects (amlodipine and HCTZ). will try ARB. Also echo due to TWI on ECG likely from HTN with known LVH. \par \par 1. Valsartan 80mg daily, check BMP/Mg in a couple weeks\par 2. Pulm follow up for YANNICK \par 3. Recommend aggressive diet and lifestyle modifications , counselled on weight loss\par 4. Increase physical activity as tolerated , limited by knee pain\par 5. Echo to evaluate ECG findings and LVH/PHTN on prior echo\par 6. follow up 2-3 months\par \par

## 2022-08-30 ENCOUNTER — NON-APPOINTMENT (OUTPATIENT)
Age: 42
End: 2022-08-30

## 2022-08-30 ENCOUNTER — APPOINTMENT (OUTPATIENT)
Dept: CARDIOLOGY | Facility: CLINIC | Age: 42
End: 2022-08-30

## 2022-08-30 VITALS
RESPIRATION RATE: 16 BRPM | BODY MASS INDEX: 39.76 KG/M2 | SYSTOLIC BLOOD PRESSURE: 133 MMHG | HEART RATE: 61 BPM | OXYGEN SATURATION: 97 % | WEIGHT: 284 LBS | DIASTOLIC BLOOD PRESSURE: 85 MMHG | HEIGHT: 71 IN

## 2022-08-30 PROCEDURE — 99214 OFFICE O/P EST MOD 30 MIN: CPT | Mod: 25

## 2022-08-30 PROCEDURE — 93000 ELECTROCARDIOGRAM COMPLETE: CPT

## 2022-08-30 RX ORDER — HYDROCHLOROTHIAZIDE 25 MG/1
25 TABLET ORAL DAILY
Qty: 90 | Refills: 1 | Status: DISCONTINUED | COMMUNITY
Start: 2021-09-11 | End: 2022-08-30

## 2022-08-30 NOTE — HISTORY OF PRESENT ILLNESS
[FreeTextEntry1] : Patient is a 43yo M with obesity, HTN, OA here for cardiac  follow up. Patient has been doing well without any chest pain or shortness of breath. Injured knee at work  and remains out on workers compensation.Patient denies PND/orthopnea/edema/palpitations/syncope/claudication. ARB changed to ACEI and now on lisinopril. Possible side effects of ED due to valsartan. BP at home running 140s/80s. No recurrence of ED. STarted riding bike recently and feeling well riding. 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 Daughters had COVID in January, mild symptoms.   \par \par ROS: GI and  negative

## 2022-08-30 NOTE — DISCUSSION/SUMMARY
[FreeTextEntry1] : Patient is a 41yo M with obesity, HTN, OA here for cardiac follow up.  \par -Patient has been doing well without any chest pain or shortness of breath. \par -Echo with signs of hypertensive heart disease (LVH, borderline increased PAP, LAE) in 2019 but improved on  echo 2020  \par -BP better but still above goal\par -CV stable otherwise \par \par \par \par 1. Increase lisinopril to 20mg daily, room to titrate further as well. continue diet/weight loss\par 2. Recommend aggressive diet and lifestyle modifications \par 3. Recommend 30 minutes moderate intensity aerobic activity 5 days per week \par 4. Counselled on weight loss\par 5. Follow up 6 months\par \par

## 2022-10-03 ENCOUNTER — OFFICE (OUTPATIENT)
Dept: URBAN - METROPOLITAN AREA CLINIC 63 | Facility: CLINIC | Age: 42
Setting detail: OPHTHALMOLOGY
End: 2022-10-03
Payer: COMMERCIAL

## 2022-10-03 DIAGNOSIS — H35.3131: ICD-10-CM

## 2022-10-03 DIAGNOSIS — H33.313: ICD-10-CM

## 2022-10-03 DIAGNOSIS — H35.033: ICD-10-CM

## 2022-10-03 DIAGNOSIS — H35.413: ICD-10-CM

## 2022-10-03 PROBLEM — H35.412 LATTICE DEGENERATION; RIGHT EYE, LEFT EYE, BOTH EYES: Status: ACTIVE | Noted: 2022-06-27

## 2022-10-03 PROBLEM — H35.411 LATTICE DEGENERATION; RIGHT EYE, LEFT EYE, BOTH EYES: Status: ACTIVE | Noted: 2022-06-27

## 2022-10-03 PROBLEM — H33.312 RETINAL TEAR; RIGHT EYE, LEFT EYE, BOTH EYES: Status: ACTIVE | Noted: 2022-06-27

## 2022-10-03 PROBLEM — H33.311 RETINAL TEAR; RIGHT EYE, LEFT EYE, BOTH EYES: Status: ACTIVE | Noted: 2022-06-27

## 2022-10-03 PROCEDURE — 92014 COMPRE OPH EXAM EST PT 1/>: CPT | Performed by: SPECIALIST

## 2022-10-03 PROCEDURE — 92134 CPTRZ OPH DX IMG PST SGM RTA: CPT | Performed by: SPECIALIST

## 2022-10-03 ASSESSMENT — VISUAL ACUITY
OD_BCVA: 20/20
OS_BCVA: 20/20

## 2022-10-03 ASSESSMENT — CONFRONTATIONAL VISUAL FIELD TEST (CVF)
OS_FINDINGS: FULL
OD_FINDINGS: FULL

## 2022-10-03 ASSESSMENT — TONOMETRY
OD_IOP_MMHG: 21
OS_IOP_MMHG: 21

## 2022-12-09 ENCOUNTER — APPOINTMENT (OUTPATIENT)
Dept: CARDIOLOGY | Facility: CLINIC | Age: 42
End: 2022-12-09

## 2022-12-09 ENCOUNTER — NON-APPOINTMENT (OUTPATIENT)
Age: 42
End: 2022-12-09

## 2022-12-09 VITALS
BODY MASS INDEX: 34.78 KG/M2 | WEIGHT: 300.6 LBS | RESPIRATION RATE: 16 BRPM | HEIGHT: 78 IN | OXYGEN SATURATION: 97 % | HEART RATE: 71 BPM

## 2022-12-09 VITALS — HEIGHT: 71 IN | BODY MASS INDEX: 41.93 KG/M2

## 2022-12-09 VITALS — SYSTOLIC BLOOD PRESSURE: 130 MMHG | DIASTOLIC BLOOD PRESSURE: 90 MMHG

## 2022-12-09 DIAGNOSIS — M54.9 DORSALGIA, UNSPECIFIED: ICD-10-CM

## 2022-12-09 PROCEDURE — 93000 ELECTROCARDIOGRAM COMPLETE: CPT

## 2022-12-09 PROCEDURE — 99214 OFFICE O/P EST MOD 30 MIN: CPT | Mod: 25

## 2022-12-09 RX ORDER — LISINOPRIL 10 MG/1
10 TABLET ORAL
Qty: 30 | Refills: 0 | Status: DISCONTINUED | COMMUNITY
Start: 2021-09-13

## 2022-12-09 NOTE — DISCUSSION/SUMMARY
[FreeTextEntry1] : Patient is a 41yo M with obesity, HTN, OA here for cardiac follow up.  \par -Patient has been doing well without any chest pain or shortness of breath. \par -Echo with signs of hypertensive heart disease (LVH, borderline increased PAP, LAE) in 2019 but improved on  echo 2020  \par -Back pain all muscular and due to spasm, follow up with PMD and consider celebrex/flexeril\par --CV stable otherwise \par \par \par \par 1. CV stable, no further work up at this time\par 2. Recommend aggressive diet and lifestyle modifications \par 3. Recommend 30 minutes moderate intensity aerobic activity 5 days per week \par 4. Counselled on weight loss\par 5. Follow up 6 months\par \par  [EKG obtained to assist in diagnosis and management of assessed problem(s)] : EKG obtained to assist in diagnosis and management of assessed problem(s)

## 2022-12-09 NOTE — HISTORY OF PRESENT ILLNESS
[FreeTextEntry1] : Patient is a 43yo M with obesity, HTN, OA here for cardiac  follow up. Noting back pain recently. Lisinopril increased last OV. Pain started last month, upper back and saw chiropracter, thought was spasm. Started getting better. Then last weekend, pain came back on right side/flank. Moved to left side now as well. Certain movements makes feel like a spasm. NO clear injury. When right side worse, started laying on left but now cant due to pain and back on right. NO CP/SOB/diaphoresis. Pain is constant. Patient denies PND/orthopnea/edema/palpitations/syncope/claudication \par \par Continues to work with setups for wife who does event planning. 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 Daughters had COVID in January, mild symptoms.   \par \par ROS: GI and  negative

## 2023-04-05 ENCOUNTER — NON-APPOINTMENT (OUTPATIENT)
Age: 43
End: 2023-04-05

## 2023-04-05 ENCOUNTER — APPOINTMENT (OUTPATIENT)
Dept: CARDIOLOGY | Facility: CLINIC | Age: 43
End: 2023-04-05
Payer: COMMERCIAL

## 2023-04-05 VITALS
HEART RATE: 68 BPM | DIASTOLIC BLOOD PRESSURE: 92 MMHG | HEIGHT: 71 IN | SYSTOLIC BLOOD PRESSURE: 152 MMHG | RESPIRATION RATE: 16 BRPM | WEIGHT: 298 LBS | OXYGEN SATURATION: 99 % | BODY MASS INDEX: 41.72 KG/M2

## 2023-04-05 PROCEDURE — 99214 OFFICE O/P EST MOD 30 MIN: CPT | Mod: 25

## 2023-04-05 PROCEDURE — 93000 ELECTROCARDIOGRAM COMPLETE: CPT

## 2023-04-05 RX ORDER — ROSUVASTATIN CALCIUM 20 MG/1
20 TABLET, FILM COATED ORAL DAILY
Refills: 0 | Status: ACTIVE | COMMUNITY

## 2023-04-05 NOTE — HISTORY OF PRESENT ILLNESS
[FreeTextEntry1] : Patient is a 43yo M with obesity, HTN, OA here for cardiac  follow up. Was having increased back pain last OV.  No CP/SOB. Patient denies PND/orthopnea/edema/palpitations/syncope/claudication  . No new symptoms, started on new BP meds recently. This morning he felt his BP was high, took a supplement he states helps. Also took ASA and increased fluid intake (water). AT home BP was 171/108. Feels a little better now, BP a bit lower here at 152/92. Struggling with sleep this past week, has guests in house with young twins. Prior BP had been running 140s/70-80s. 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 \par \par ROS: GI and  negative

## 2023-04-05 NOTE — DISCUSSION/SUMMARY
[FreeTextEntry1] : Patient is a 41yo M with obesity, HTN, OA here for cardiac follow up.  \par -Patient has been doing well without any chest pain or shortness of breath. \par -Echo with signs of hypertensive heart disease (LVH, borderline increased PAP, LAE) in 2019 but improved on  echo 2020  \par -BP high, poor sleep and has not been taking all 3 BP meds which I advise him to do more consistently. Will also see improvement if can get weight down. \par -ECG mildly abnormal today with more NSST than in past \par \par \par 1. Will obtain CAC and BW done recently\par 2. Recommend aggressive diet and lifestyle modifications \par 3. Recommend 30 minutes moderate intensity aerobic activity 5 days per week \par 4. Counselled on weight loss\par 5. Continue BP meds and to take regularly, maybe able to cut back HCTZ as loses weight\par 6. Echo to evaluate mild ECG changes \par 7. Follow up 3-4 months\par  [EKG obtained to assist in diagnosis and management of assessed problem(s)] : EKG obtained to assist in diagnosis and management of assessed problem(s)

## 2023-04-05 NOTE — ASSESSMENT
[FreeTextEntry1] : ECG: SR, NSST \par \par  TG 70 HDL 40  (3/2018)\par \par ECHO 7/2020:\par 1. Mild LVE, EF 55-60%\par 2. Grade I diastolic dysfunction\par 3. Mild LAE,  borderline CAMMIE\par 4. Trivial MR\par 5. LV wall thickness,  PA pressures and RA size have all decreased compared to prior\par \par ECHO 2/2019:\par 1. Mild concentric left ventricular hypertrophy.\par 2. Normal global left ventricular systolic function.\par 3. Left ventricular ejection fraction, by visual estimation, is 55 to 60%.\par 4. Impaired relaxation pattern of LV diastolic filling.\par 5. Normal right ventricular size and systolic function.\par 6. Mildly dilated left atrium.\par 7. Moderately dilated right atrium.\par 8. Normal aortic valve, without any evidence of aortic stenosis or insufficiency.\par 9. Mild thickening of the anterior and posterior mitral valve leaflets.\par 10. Trace mitral valve regurgitation.\par 11. Mildly elevated pulmonary artery systolic pressure.\par 12. Mild tricuspid regurgitation.\par 13. Interatrial and interventricular septa appear intact.\par 14. Recommend clinical correlation with the above findings.\par \par EST 2016:\par 1. Negative exercise stress test for ischemia.\par 2. The patient developed no dysrhythmias during exercise.\par 3. The blood pressure response was hypertensive baseline with normal response to exercise.\par 4. The patient developed knee pain during the stress exam. The symptoms resolved with rest.\par 5. The test was terminated due to knee pain.\par 6. The patient's functional capacity was below average.\par 7. The Duke Treadmill Score was 7, consistent with low risk.\par \par

## 2023-04-21 ENCOUNTER — APPOINTMENT (OUTPATIENT)
Dept: CARDIOLOGY | Facility: CLINIC | Age: 43
End: 2023-04-21
Payer: COMMERCIAL

## 2023-04-21 PROCEDURE — 93306 TTE W/DOPPLER COMPLETE: CPT

## 2023-08-22 ENCOUNTER — APPOINTMENT (OUTPATIENT)
Dept: CARDIOLOGY | Facility: CLINIC | Age: 43
End: 2023-08-22
Payer: COMMERCIAL

## 2023-08-22 ENCOUNTER — NON-APPOINTMENT (OUTPATIENT)
Age: 43
End: 2023-08-22

## 2023-08-22 VITALS
HEIGHT: 71 IN | OXYGEN SATURATION: 96 % | HEART RATE: 65 BPM | WEIGHT: 298 LBS | BODY MASS INDEX: 41.72 KG/M2 | RESPIRATION RATE: 16 BRPM | SYSTOLIC BLOOD PRESSURE: 140 MMHG | DIASTOLIC BLOOD PRESSURE: 96 MMHG

## 2023-08-22 DIAGNOSIS — E66.01 MORBID (SEVERE) OBESITY DUE TO EXCESS CALORIES: ICD-10-CM

## 2023-08-22 PROCEDURE — 99214 OFFICE O/P EST MOD 30 MIN: CPT | Mod: 25

## 2023-08-22 PROCEDURE — 93000 ELECTROCARDIOGRAM COMPLETE: CPT

## 2023-08-22 NOTE — HISTORY OF PRESENT ILLNESS
[FreeTextEntry1] : Patient is a 42yo M with obesity, HTN, OA here for cardiac  follow up.  No CP/SOB. Patient denies PND/orthopnea/edema/palpitations/syncope/claudication  . No new symptoms. Stil with some mild knee pain but overall stable. Taking meds regularly. BP at home running 130s systolic.     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 Has been on workers comp with [employer] for 5 years from knee injury.   ROS: GI and  negative

## 2023-08-22 NOTE — DISCUSSION/SUMMARY
[EKG obtained to assist in diagnosis and management of assessed problem(s)] : EKG obtained to assist in diagnosis and management of assessed problem(s) [FreeTextEntry1] : Patient is a 44yo M with obesity, HTN, OA here for cardiac follow up.   -Patient has been doing well without any chest pain or shortness of breath.  -Echo with signs of hypertensive heart disease (LVH, borderline increased PAP, LAE) in 2019 but improved on  echo 2020 . Mild increase again in wall thickness noted 4/2023 echo, otherwise normal function   -ECG normal today  -Lipids controlled 4/2023    1. Will arrange home BP monitor and adjust meds accordingly, seems lower at home per patient still but increased wall thickness on echo. Call with results of monitor  2. Recommend aggressive diet and lifestyle modifications  3. Recommend 30 minutes moderate intensity aerobic activity 5 days per week  4. Counselled on weight loss 5. Continue BP meds and to take regularly 6. Follow up 6 months

## 2023-08-22 NOTE — ASSESSMENT
[FreeTextEntry1] : ECG: SB, no significant ST-T abnormalities and normal intervals    HDL 46 LDL 58 TG 42 (4/2023)   TG 70 HDL 40  (3/2018)  ECHO 4/2023: 1. Mild septal LVH, EF 55-60% 2. Normal RV/LA/RA 3. No clinically significant valvular disease  4. Borderline dilated aortic root   ECHO 7/2020: 1. Mild LVE, EF 55-60% 2. Grade I diastolic dysfunction 3. Mild LAE,  borderline CAMMIE 4. Trivial MR 5. LV wall thickness,  PA pressures and RA size have all decreased compared to prior  ECHO 2/2019: 1. Mild concentric left ventricular hypertrophy. 2. Normal global left ventricular systolic function. 3. Left ventricular ejection fraction, by visual estimation, is 55 to 60%. 4. Impaired relaxation pattern of LV diastolic filling. 5. Normal right ventricular size and systolic function. 6. Mildly dilated left atrium. 7. Moderately dilated right atrium. 8. Normal aortic valve, without any evidence of aortic stenosis or insufficiency. 9. Mild thickening of the anterior and posterior mitral valve leaflets. 10. Trace mitral valve regurgitation. 11. Mildly elevated pulmonary artery systolic pressure. 12. Mild tricuspid regurgitation. 13. Interatrial and interventricular septa appear intact. 14. Recommend clinical correlation with the above findings.  EST 2016: 1. Negative exercise stress test for ischemia. 2. The patient developed no dysrhythmias during exercise. 3. The blood pressure response was hypertensive baseline with normal response to exercise. 4. The patient developed knee pain during the stress exam. The symptoms resolved with rest. 5. The test was terminated due to knee pain. 6. The patient's functional capacity was below average. 7. The Duke Treadmill Score was 7, consistent with low risk.

## 2023-09-13 ENCOUNTER — APPOINTMENT (OUTPATIENT)
Dept: CARDIOLOGY | Facility: CLINIC | Age: 43
End: 2023-09-13
Payer: COMMERCIAL

## 2023-09-13 PROCEDURE — ZZZZZ: CPT

## 2023-09-15 RX ORDER — LISINOPRIL 20 MG/1
20 TABLET ORAL DAILY
Qty: 90 | Refills: 2 | Status: DISCONTINUED | COMMUNITY
Start: 2021-09-13 | End: 2023-09-15

## 2023-09-15 RX ORDER — HYDROCHLOROTHIAZIDE 12.5 MG/1
12.5 TABLET ORAL DAILY
Refills: 0 | Status: DISCONTINUED | COMMUNITY
End: 2023-09-15

## 2023-09-15 RX ORDER — LISINOPRIL AND HYDROCHLOROTHIAZIDE TABLETS 20; 12.5 MG/1; MG/1
20-12.5 TABLET ORAL TWICE DAILY
Qty: 180 | Refills: 1 | Status: ACTIVE | COMMUNITY
Start: 2023-09-15 | End: 1900-01-01

## 2023-12-20 ENCOUNTER — NON-APPOINTMENT (OUTPATIENT)
Age: 43
End: 2023-12-20

## 2023-12-20 ENCOUNTER — APPOINTMENT (OUTPATIENT)
Dept: CARDIOLOGY | Facility: CLINIC | Age: 43
End: 2023-12-20
Payer: COMMERCIAL

## 2023-12-20 VITALS
OXYGEN SATURATION: 97 % | SYSTOLIC BLOOD PRESSURE: 128 MMHG | WEIGHT: 284 LBS | DIASTOLIC BLOOD PRESSURE: 90 MMHG | RESPIRATION RATE: 16 BRPM | HEIGHT: 71 IN | BODY MASS INDEX: 39.76 KG/M2 | HEART RATE: 57 BPM

## 2023-12-20 DIAGNOSIS — I10 ESSENTIAL (PRIMARY) HYPERTENSION: ICD-10-CM

## 2023-12-20 DIAGNOSIS — I51.7 CARDIOMEGALY: ICD-10-CM

## 2023-12-20 DIAGNOSIS — G47.33 OBSTRUCTIVE SLEEP APNEA (ADULT) (PEDIATRIC): ICD-10-CM

## 2023-12-20 DIAGNOSIS — R94.31 ABNORMAL ELECTROCARDIOGRAM [ECG] [EKG]: ICD-10-CM

## 2023-12-20 DIAGNOSIS — E78.5 HYPERLIPIDEMIA, UNSPECIFIED: ICD-10-CM

## 2023-12-20 DIAGNOSIS — E66.9 OBESITY, UNSPECIFIED: ICD-10-CM

## 2023-12-20 PROCEDURE — 93000 ELECTROCARDIOGRAM COMPLETE: CPT

## 2023-12-20 PROCEDURE — 99214 OFFICE O/P EST MOD 30 MIN: CPT | Mod: 25

## 2023-12-20 RX ORDER — AMLODIPINE BESYLATE 5 MG/1
5 TABLET ORAL
Qty: 180 | Refills: 3 | Status: ACTIVE | COMMUNITY
Start: 1900-01-01 | End: 1900-01-01

## 2023-12-20 NOTE — DISCUSSION/SUMMARY
[FreeTextEntry1] : Patient is a 42yo M with obesity, HTN, OA here for cardiac follow up.   -Patient has been doing well without any chest pain or shortness of breath.  -Echo with signs of hypertensive heart disease (LVH, borderline increased PAP, LAE) in 2019 but improved on  echo 2020 . Mild increase again in wall thickness noted 4/2023 echo, otherwise normal function  -Negative EST in 2016 -ECG unremarkable today  -Lipids controlled 4/2023  -Diet better, weight down, BP better controlled    1. CV stable, continue aggressive risk factor modification  2. Recommend aggressive diet and lifestyle modifications  3. Recommend 30 minutes moderate intensity aerobic activity 5 days per week  4. Counselled on weight loss, has lost weight. BP better,  diet better and will be exercising more, recently obtained new equipment.  5. Stopped statin 1-2 months ago, was concerned about side effects , had heard about ? recall.    [EKG obtained to assist in diagnosis and management of assessed problem(s)] : EKG obtained to assist in diagnosis and management of assessed problem(s)

## 2023-12-20 NOTE — HISTORY OF PRESENT ILLNESS
[FreeTextEntry1] : Patient is a 42yo M with obesity, HTN, OA here for cardiac  follow up.  No CP/SOB. Patient denies PND/orthopnea/edema/palpitations/syncope/claudication  . No new symptoms. Stil with some mild knee pain but overall stable. Taking meds regularly.. On 9/15/2023 had  BP monitor reviewed and higher at home than in office. Average 157/98. changed to lisinopril-HCT 20/12.5mg po bid and here to re-evaluate . No new physical complaints at this time. Remains busy with work/family.     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 Has been on workers comp with [email or web address] for > 5 years from knee injury.   ROS: GI and  negative

## 2023-12-20 NOTE — ASSESSMENT
[FreeTextEntry1] :  ECG: SB, no significant ST-T abnormalities and normal intervals   HDL 46 LDL 58 TG 42 (4/2023)  TG 70 HDL 40  (3/2018)  ECHO 4/2023: 1. Mild septal LVH, EF 55-60% 2. Normal RV/LA/RA 3. No clinically significant valvular disease 4. Borderline dilated aortic root  ECHO 7/2020: 1. Mild LVE, EF 55-60% 2. Grade I diastolic dysfunction 3. Mild LAE, borderline CAMMIE 4. Trivial MR 5. LV wall thickness, PA pressures and RA size have all decreased compared to prior  ECHO 2/2019: 1. Mild concentric left ventricular hypertrophy. 2. Normal global left ventricular systolic function. 3. Left ventricular ejection fraction, by visual estimation, is 55 to 60%. 4. Impaired relaxation pattern of LV diastolic filling. 5. Normal right ventricular size and systolic function. 6. Mildly dilated left atrium. 7. Moderately dilated right atrium. 8. Normal aortic valve, without any evidence of aortic stenosis or insufficiency.. 9. Mildly elevated pulmonary artery systolic pressure. 10. Mild tricuspid regurgitation.   EST 2016: 1. Negative exercise stress test for ischemia. 2. The patient developed no dysrhythmias during exercise. 3. The blood pressure response was hypertensive baseline with normal response to exercise. 4. The Duke Treadmill Score was 7, consistent with low risk.

## 2024-08-20 ENCOUNTER — NON-APPOINTMENT (OUTPATIENT)
Age: 44
End: 2024-08-20

## 2024-08-20 ENCOUNTER — APPOINTMENT (OUTPATIENT)
Dept: CARDIOLOGY | Facility: CLINIC | Age: 44
End: 2024-08-20
Payer: COMMERCIAL

## 2024-08-20 VITALS
DIASTOLIC BLOOD PRESSURE: 100 MMHG | HEART RATE: 57 BPM | OXYGEN SATURATION: 97 % | WEIGHT: 280 LBS | HEIGHT: 71 IN | BODY MASS INDEX: 39.2 KG/M2 | SYSTOLIC BLOOD PRESSURE: 138 MMHG | RESPIRATION RATE: 15 BRPM

## 2024-08-20 DIAGNOSIS — I10 ESSENTIAL (PRIMARY) HYPERTENSION: ICD-10-CM

## 2024-08-20 DIAGNOSIS — G47.33 OBSTRUCTIVE SLEEP APNEA (ADULT) (PEDIATRIC): ICD-10-CM

## 2024-08-20 DIAGNOSIS — I51.7 CARDIOMEGALY: ICD-10-CM

## 2024-08-20 DIAGNOSIS — E66.9 OBESITY, UNSPECIFIED: ICD-10-CM

## 2024-08-20 DIAGNOSIS — R94.31 ABNORMAL ELECTROCARDIOGRAM [ECG] [EKG]: ICD-10-CM

## 2024-08-20 PROCEDURE — G2211 COMPLEX E/M VISIT ADD ON: CPT | Mod: NC

## 2024-08-20 PROCEDURE — 99214 OFFICE O/P EST MOD 30 MIN: CPT

## 2024-08-20 PROCEDURE — 93000 ELECTROCARDIOGRAM COMPLETE: CPT

## 2024-08-20 NOTE — HISTORY OF PRESENT ILLNESS
[FreeTextEntry1] : Patient is a 45yo M with obesity, HTN, HLD, YANNICK here for cardiac  follow up. Patient has been doing well without any chest pain or shortness of breath. Patient denies PND/orthopnea/edema/palpitations/syncope/claudication. No new symptoms at this time. Has stopped meds this past month. STates sleeping and snoring better since using "mouth tape".  BP at home running 130s/80-90s.     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 Has been on workers comp with [employer] for > 5 years from knee injury.   ROS: GI and  negative

## 2024-08-20 NOTE — HISTORY OF PRESENT ILLNESS
[FreeTextEntry1] : Patient is a 43yo M with obesity, HTN, HLD, YANNICK here for cardiac  follow up. Patient has been doing well without any chest pain or shortness of breath. Patient denies PND/orthopnea/edema/palpitations/syncope/claudication. No new symptoms at this time. Has stopped meds this past month. STates sleeping and snoring better since using "mouth tape".  BP at home running 130s/80-90s.     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 Has been on workers comp with [employer] for > 5 years from knee injury.   ROS: GI and  negative

## 2024-08-20 NOTE — DISCUSSION/SUMMARY
[EKG obtained to assist in diagnosis and management of assessed problem(s)] : EKG obtained to assist in diagnosis and management of assessed problem(s) [FreeTextEntry1] : Patient is a 45yo M with obesity, HTN, YANNICK, HLD here for cardiac follow up.    HTN  -Echo with signs of hypertensive heart disease (LVH, borderline increased PAP, LAE) in 2019 but improved on  echo 2020 . Mild increase again in wall thickness noted 4/2023 echo, otherwise normal function  -Negative EST in 2016 -Stopped meds again recently and BP high now. No major side effects, seems to feel better off meds though and doesnt like having to take them. will arrange ABPM again to see how he is doing with current diet and BP. Add back meds pending results.   HL:D -Last lipids from 2023 within acceptable range   1. CV stable, continue aggressive risk factor modification  2. Recommend aggressive diet and lifestyle modifications  3. Recommend 30 minutes moderate intensity aerobic activity 5 days per week  4. ABPM, call with results and follow up 3 months  5. Stopped statin 1-2 months ago, was concerned about side effects , had heard about ? recall.

## 2024-08-20 NOTE — DISCUSSION/SUMMARY
[EKG obtained to assist in diagnosis and management of assessed problem(s)] : EKG obtained to assist in diagnosis and management of assessed problem(s) [FreeTextEntry1] : Patient is a 43yo M with obesity, HTN, YANNICK, HLD here for cardiac follow up.    HTN  -Echo with signs of hypertensive heart disease (LVH, borderline increased PAP, LAE) in 2019 but improved on  echo 2020 . Mild increase again in wall thickness noted 4/2023 echo, otherwise normal function  -Negative EST in 2016 -Stopped meds again recently and BP high now. No major side effects, seems to feel better off meds though and doesnt like having to take them. will arrange ABPM again to see how he is doing with current diet and BP. Add back meds pending results.   HL:D -Last lipids from 2023 within acceptable range   1. CV stable, continue aggressive risk factor modification  2. Recommend aggressive diet and lifestyle modifications  3. Recommend 30 minutes moderate intensity aerobic activity 5 days per week  4. ABPM, call with results and follow up 3 months  5. Stopped statin 1-2 months ago, was concerned about side effects , had heard about ? recall.

## 2024-08-20 NOTE — ASSESSMENT
[FreeTextEntry1] : ECG: SB, anterolateral TWI   HDL 46 LDL 58 TG 42 (4/2023)  TG 70 HDL 40  (3/2018)  ECHO 4/2023: 1. Mild septal LVH, EF 55-60% 2. Normal RV/LA/RA 3. No clinically significant valvular disease 4. Borderline dilated aortic root  ECHO 7/2020: 1. Mild LVE, EF 55-60% 2. Grade I diastolic dysfunction 3. Mild LAE, borderline CAMMIE 4. Trivial MR 5. LV wall thickness, PA pressures and RA size have all decreased compared to prior  ECHO 2/2019: 1. Mild concentric left ventricular hypertrophy. 2. Normal global left ventricular systolic function. 3. Left ventricular ejection fraction, by visual estimation, is 55 to 60%. 4. Impaired relaxation pattern of LV diastolic filling. 5. Normal right ventricular size and systolic function. 6. Mildly dilated left atrium. 7. Moderately dilated right atrium. 8. Normal aortic valve, without any evidence of aortic stenosis or insufficiency.. 9. Mildly elevated pulmonary artery systolic pressure. 10. Mild tricuspid regurgitation.   EST 2016: 1. Negative exercise stress test for ischemia. 2. The patient developed no dysrhythmias during exercise. 3. The blood pressure response was hypertensive baseline with normal response to exercise. 4. The Duke Treadmill Score was 7, consistent with low risk.

## 2024-09-11 ENCOUNTER — APPOINTMENT (OUTPATIENT)
Dept: CARDIOLOGY | Facility: CLINIC | Age: 44
End: 2024-09-11

## 2024-09-11 PROCEDURE — ZZZZZ: CPT

## 2024-09-16 ENCOUNTER — NON-APPOINTMENT (OUTPATIENT)
Age: 44
End: 2024-09-16

## 2024-12-05 ENCOUNTER — APPOINTMENT (OUTPATIENT)
Dept: CARDIOLOGY | Facility: CLINIC | Age: 44
End: 2024-12-05